# Patient Record
Sex: FEMALE | URBAN - METROPOLITAN AREA
[De-identification: names, ages, dates, MRNs, and addresses within clinical notes are randomized per-mention and may not be internally consistent; named-entity substitution may affect disease eponyms.]

---

## 2019-02-04 ENCOUNTER — APPOINTMENT (RX ONLY)
Dept: URBAN - METROPOLITAN AREA CLINIC 157 | Facility: CLINIC | Age: 19
Setting detail: DERMATOLOGY
End: 2019-02-04

## 2019-02-04 DIAGNOSIS — B07.8 OTHER VIRAL WARTS: ICD-10-CM | Status: INADEQUATELY CONTROLLED

## 2019-02-04 PROCEDURE — ? TREATMENT REGIMEN

## 2019-02-04 PROCEDURE — ? DIAGNOSIS COMMENT

## 2019-02-04 PROCEDURE — 99202 OFFICE O/P NEW SF 15 MIN: CPT

## 2019-02-04 PROCEDURE — ? COUNSELING

## 2019-02-04 NOTE — PROCEDURE: DIAGNOSIS COMMENT
Detail Level: Detailed
Comment: Phot taken 4-5 large lesions with multiple flat warts scattered knees, hand

## 2019-02-04 NOTE — PROCEDURE: TREATMENT REGIMEN
Initiate Treatment: Wart Peel ( 5fu , sal acid) in remedium delivery system- apply once daily  following instruction sheet- sent to [facility]. Treat larger lesions only, not flat warts.\\nWritten instructions on use given with reference to online video instructions.\\nDiscussed pregnancy precautions with medication. Denies pregnancy; not sexually active.
Detail Level: Detailed
Plan: Discussed realistic expectations with wart treatment.